# Patient Record
(demographics unavailable — no encounter records)

---

## 2025-07-23 NOTE — HISTORY OF PRESENT ILLNESS
[de-identified] : 7/23/25: left thumb clicking/cracking x 3weeks- RHD, retired  [FreeTextEntry1] : R thumb  [FreeTextEntry5] : R thumb pain that developed 2 weeks ago

## 2025-07-23 NOTE — IMAGING
[de-identified] : right thumb TTP a1 pulley +triggering otherwise farom neurovascular intact distally   Right hand x-rays 3 views taken today in office demonstrate no acute changes

## 2025-07-23 NOTE — ASSESSMENT
[FreeTextEntry1] : The patient was advised of the diagnosis. The natural history of the pathology was explained in full to the patient in layman's terms. All questions were answered. The risks and benefits of surgical and non-surgical treatment alternatives were explained in full to the patient.  We reviewed the anatomy of the flexor sheath and pathology of trigger fingers with the use of drawings and discussion.  We discussed the treatment options including splinting/nsaids, injection and surgery.  We discussed that too many injections may lead to weakening of the tendon/tendon rupture and the safety of two injections. After a discussion of the risks, benefits and alternatives along with the expectations, the patient was amenable to observation.  Patient declines CSI today as she has glaucoma and would like to speak to her ophthalmologist beforehand.   Recommend Voltaren  NSAIDs recommended.  Patient warned of risk of NSAID medication to stomach and GI tract, risk of increase blood pressure, cardiac risk, and risk of fluid retention.  The patient should clear taking medication with internist/PMD if any problem with heart, blood pressure, or GI system exists.  RTO PRN

## 2025-07-23 NOTE — HISTORY OF PRESENT ILLNESS
[de-identified] : 7/23/25: left thumb clicking/cracking x 3weeks- RHD, retired  [FreeTextEntry1] : R thumb  [FreeTextEntry5] : R thumb pain that developed 2 weeks ago

## 2025-07-23 NOTE — IMAGING
[de-identified] : right thumb TTP a1 pulley +triggering otherwise farom neurovascular intact distally   Right hand x-rays 3 views taken today in office demonstrate no acute changes